# Patient Record
Sex: FEMALE | Race: WHITE | Employment: FULL TIME | URBAN - METROPOLITAN AREA
[De-identification: names, ages, dates, MRNs, and addresses within clinical notes are randomized per-mention and may not be internally consistent; named-entity substitution may affect disease eponyms.]

---

## 2017-02-17 RX ORDER — DROSPIRENONE, ETHINYL ESTRADIOL, LEVOMEFOLATE CALCIUM 3-0.02(24)
KIT ORAL
Qty: 28 TABLET | Refills: 0 | OUTPATIENT
Start: 2017-02-17

## 2017-02-23 RX ORDER — DROSPIRENONE, ETHINYL ESTRADIOL AND LEVOMEFOLATE CALCIUM AND LEVOMEFOLATE CALCIUM 3-0.02(24)
28 KIT ORAL DAILY
Qty: 28 TABLET | Refills: 0 | Status: SHIPPED | OUTPATIENT
Start: 2017-02-23 | End: 2017-03-07

## 2017-03-07 ENCOUNTER — OFFICE VISIT (OUTPATIENT)
Dept: OBGYN CLINIC | Facility: CLINIC | Age: 27
End: 2017-03-07

## 2017-03-07 VITALS
WEIGHT: 131 LBS | BODY MASS INDEX: 21.05 KG/M2 | RESPIRATION RATE: 16 BRPM | SYSTOLIC BLOOD PRESSURE: 104 MMHG | HEIGHT: 66 IN | HEART RATE: 88 BPM | DIASTOLIC BLOOD PRESSURE: 60 MMHG

## 2017-03-07 DIAGNOSIS — Z01.419 ENCOUNTER FOR WELL WOMAN EXAM WITH ROUTINE GYNECOLOGICAL EXAM: Primary | ICD-10-CM

## 2017-03-07 PROBLEM — E03.9 HYPOTHYROIDISM: Chronic | Status: ACTIVE | Noted: 2017-03-07

## 2017-03-07 PROBLEM — C73 THYROID CANCER (HCC): Status: ACTIVE | Noted: 2017-03-07

## 2017-03-07 PROCEDURE — 88175 CYTOPATH C/V AUTO FLUID REDO: CPT | Performed by: OBSTETRICS & GYNECOLOGY

## 2017-03-07 PROCEDURE — 99395 PREV VISIT EST AGE 18-39: CPT | Performed by: OBSTETRICS & GYNECOLOGY

## 2017-03-07 RX ORDER — LEVOTHYROXINE SODIUM 0.05 MG/1
50 TABLET ORAL
COMMUNITY

## 2017-03-07 RX ORDER — DROSPIRENONE, ETHINYL ESTRADIOL AND LEVOMEFOLATE CALCIUM AND LEVOMEFOLATE CALCIUM 3-0.02(24)
28 KIT ORAL DAILY
Qty: 28 TABLET | Refills: 12 | Status: SHIPPED | OUTPATIENT
Start: 2017-03-07 | End: 2017-04-04

## 2017-03-07 NOTE — PROGRESS NOTES
Viridiana Hernandez is a 27year old female No obstetric history on file. Patient's last menstrual period was 02/22/2017 (approximate).  Patient presents with:  Wellness Visit: annual  .  No complaints    OBSTETRICS HISTORY:  OB History   No data available child  Sulfa Antibiotics       Unknown    Comment:As a child      Review of Systems:  Constitutional:  Denies fatigue, night sweats, hot flashes  Eyes:  denies blurred or double vision  Cardiovascular:  denies chest pain or palpitations  Respiratory:  vandana gynecological exam  -     ThinPrep PAP with HPV Reflex  if ASCUS or neg; Future    Other orders  -     Drospiren-Eth Estrad-Levomefol (BEYAZ) 3-0.02-0.451 MG Oral Tab; Take 28 tablets by mouth daily.

## (undated) NOTE — MR AVS SNAPSHOT
Yomi Nolen  [address], [facility] [address]  [address] [place] [id number]               Thank you for choosing us for your health care visit with [facility], DO.   We are glad to serve you and happy to provide you with this MATT Enriquez. [phone], [address] [facility]  [address] [place] [address] [address]     Phone:  [phone]    - Drospiren-Eth Estrad-Levomefol 3-0.02-0.451 MG Tabs            Results of Recent Testing       MyChart     Visit MyChart  You can access

## (undated) NOTE — MR AVS SNAPSHOT
After Visit Summary   3/7/2017    Manfred García    MRN: [id number]           Visit Information        Provider Department Dept Phone    3/7/2017 12:45 PM DO Colt Kelsey Obrobbinn Debbie Cervantes [phone]      Your Vitals Were     BP Pulse Resp Ht experience and are looking for ways to make improvements. Your feedback will help us do so. For more information on [employer], please visit [email or web address]